# Patient Record
Sex: FEMALE | URBAN - METROPOLITAN AREA
[De-identification: names, ages, dates, MRNs, and addresses within clinical notes are randomized per-mention and may not be internally consistent; named-entity substitution may affect disease eponyms.]

---

## 2019-07-22 ENCOUNTER — EMERGENCY (EMERGENCY)
Facility: HOSPITAL | Age: 44
LOS: 1 days | Discharge: HOME | End: 2019-07-22
Attending: EMERGENCY MEDICINE | Admitting: EMERGENCY MEDICINE
Payer: COMMERCIAL

## 2019-07-22 VITALS
SYSTOLIC BLOOD PRESSURE: 112 MMHG | HEART RATE: 82 BPM | TEMPERATURE: 98 F | RESPIRATION RATE: 17 BRPM | OXYGEN SATURATION: 98 % | DIASTOLIC BLOOD PRESSURE: 78 MMHG

## 2019-07-22 VITALS
OXYGEN SATURATION: 99 % | RESPIRATION RATE: 19 BRPM | SYSTOLIC BLOOD PRESSURE: 115 MMHG | TEMPERATURE: 98 F | HEART RATE: 79 BPM | DIASTOLIC BLOOD PRESSURE: 75 MMHG | WEIGHT: 110.23 LBS

## 2019-07-22 PROCEDURE — 99283 EMERGENCY DEPT VISIT LOW MDM: CPT

## 2019-07-22 RX ORDER — ACETAMINOPHEN 500 MG
975 TABLET ORAL ONCE
Refills: 0 | Status: COMPLETED | OUTPATIENT
Start: 2019-07-22 | End: 2019-07-22

## 2019-07-22 RX ORDER — GABAPENTIN 400 MG/1
1 CAPSULE ORAL
Qty: 21 | Refills: 0
Start: 2019-07-22 | End: 2019-07-28

## 2019-07-22 RX ORDER — DIAZEPAM 5 MG
1 TABLET ORAL
Qty: 9 | Refills: 0
Start: 2019-07-22 | End: 2019-07-24

## 2019-07-22 RX ORDER — LIDOCAINE 4 G/100G
1 CREAM TOPICAL ONCE
Refills: 0 | Status: COMPLETED | OUTPATIENT
Start: 2019-07-22 | End: 2019-07-22

## 2019-07-22 RX ADMIN — LIDOCAINE 1 PATCH: 4 CREAM TOPICAL at 12:41

## 2019-07-22 RX ADMIN — Medication 975 MILLIGRAM(S): at 12:40

## 2019-07-22 NOTE — ED PROVIDER NOTE - CHIEF COMPLAINT
Problem: Patient Care Overview (Adult)  Goal: Plan of Care Review  Outcome: Ongoing (interventions implemented as appropriate)  Goal: Adult Individualization and Mutuality  Outcome: Ongoing (interventions implemented as appropriate)  Goal: Discharge Needs Assessment  Outcome: Ongoing (interventions implemented as appropriate)    Problem: Pain, Acute (Adult)  Goal: Identify Related Risk Factors and Signs and Symptoms  Outcome: Ongoing (interventions implemented as appropriate)  Goal: Acceptable Pain Control/Comfort Level  Outcome: Ongoing (interventions implemented as appropriate)    Problem: Pancreatitis, Acute/Chronic (Adult)  Goal: Signs and Symptoms of Listed Potential Problems Will be Absent or Manageable (Pancreatitis, Acute/Chronic)  Outcome: Ongoing (interventions implemented as appropriate)    Problem: Fall Risk (Adult)  Goal: Identify Related Risk Factors and Signs and Symptoms  Outcome: Ongoing (interventions implemented as appropriate)  Goal: Absence of Falls  Outcome: Ongoing (interventions implemented as appropriate)    Problem: Diabetes, Type 2 (Adult)  Goal: Signs and Symptoms of Listed Potential Problems Will be Absent or Manageable (Diabetes, Type 2)  Outcome: Ongoing (interventions implemented as appropriate)    Problem: Pneumonia (Adult)  Goal: Signs and Symptoms of Listed Potential Problems Will be Absent or Manageable (Pneumonia)  Outcome: Ongoing (interventions implemented as appropriate)       The patient is a 44y Female complaining of neck injury.
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
## 2019-07-22 NOTE — ED PROVIDER NOTE - NSFOLLOWUPINSTRUCTIONS_ED_ALL_ED_FT
Please follow up with your primary physician in 24-48 hr.  Seek immediate medical care for any new/worsening signs or symptoms including worsening weakness or tingling in the arm, passing out/losing consciousness, vision changes, vomiting, difficulty breathing.   Please take 600 mg of Ibuprofen (aka Motrin, Advil) and/or 650 mg Acetaminophen (aka Tylenol) every 6 hours, as needed, for mild-moderate pain.    Please do not take these medications if you do not have pain or if you have any history of bleeding disorders, kidney or liver disease.   Do not use ibuprofen if you are on blood thinners (anti-coagulation).  Take VALIUM 1 tablet every 8 hours as needed for neck pain. THIS WILL MAKE YOU DROWSY, DO NOT DRIVE IF YOU TAKE THIS MEDICATION. DO NOT DRINK ALCOHOL IF YOU TAKE THIS MEDICATION.  Take gabapentin 1 capsule every 8 hours.    FOLLOW UP WITH A SPINE SPECIALIST OR A NEUROSURGEON IN [place] THIS WEEK. CALL FOR AN APPOINTMENT TODAY.

## 2019-07-22 NOTE — ED PROVIDER NOTE - CARE PLAN
Principal Discharge DX:	Neck pain  Secondary Diagnosis:	Muscle spasm  Secondary Diagnosis:	Paresthesias
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
## 2019-07-22 NOTE — ED PROVIDER NOTE - PHYSICAL EXAMINATION
Neuro: Awake, alert and fully oriented x 4. No evidence of cognitive or language dysfunction.  Cranial nerves: Extraocular movements full. Pupils equal, round, react to light. No nystagmus noted. Fifth nerve function is normal. There is no facial asymmetry noted. CN XII intact.   Motor exam: Right  strength 5/5 although diminished compared to left. Limited ROM right shoulder 2/2 pain. No pronator drift. Sensory examination is intact. Normal gait
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
## 2019-07-22 NOTE — ED ADULT NURSE NOTE - CHPI ED NUR SYMPTOMS NEG
no fever/no weakness/no nausea/no chills/no vomiting/no decreased eating/drinking/no dizziness/no tingling

## 2019-07-22 NOTE — ED ADULT TRIAGE NOTE - CADM TRG TX PRIOR TO ARRIVAL
ADHD    Anxiety    Asthma    Benzodiazepine dependence    Cannabis dependence    Cocaine dependence    Drug abuse    GERD (gastroesophageal reflux disease)    H/O bipolar disorder    Hepatitis-C    HTN (hypertension)    Nicotine dependence    Suicide  about 10 times in the past medications

## 2019-07-22 NOTE — ED ADULT TRIAGE NOTE - PAIN: PRESENCE, MLM
Head pressure x5 hours, pt states she thinks it might be her blood pressure. Pt has hx of htn  
complains of pain/discomfort

## 2019-07-22 NOTE — ED PROVIDER NOTE - MUSCULOSKELETAL MINIMAL EXAM
No bony spinal tenderness or stepoffs. Right paracervical spinal muscle spasm, right rhomboid and trapezius muscle spasm

## 2019-07-26 DIAGNOSIS — M54.2 CERVICALGIA: ICD-10-CM

## 2019-07-26 DIAGNOSIS — M79.601 PAIN IN RIGHT ARM: ICD-10-CM

## 2019-07-26 DIAGNOSIS — R20.2 PARESTHESIA OF SKIN: ICD-10-CM

## 2019-07-26 DIAGNOSIS — M62.830 MUSCLE SPASM OF BACK: ICD-10-CM

## 2019-07-26 DIAGNOSIS — M54.6 PAIN IN THORACIC SPINE: ICD-10-CM

## 2020-01-29 NOTE — ED ADULT NURSE NOTE - NSFALLRSKUNASSIST_ED_ALL_ED
no Star Wedge Flap Text: The defect edges were debeveled with a #15 scalpel blade.  Given the location of the defect, shape of the defect and the proximity to free margins a star wedge flap was deemed most appropriate.  Using a sterile surgical marker, an appropriate rotation flap was drawn incorporating the defect and placing the expected incisions within the relaxed skin tension lines where possible. The area thus outlined was incised deep to adipose tissue with a #15 scalpel blade.  The skin margins were undermined to an appropriate distance in all directions utilizing iris scissors.

## 2022-11-14 NOTE — ED PROVIDER NOTE - NS ED MD EM SELECTION
92036 Detailed Azithromycin Pregnancy And Lactation Text: This medication is considered safe during pregnancy and is also secreted in breast milk.